# Patient Record
(demographics unavailable — no encounter records)

---

## 2024-12-05 NOTE — PHYSICAL EXAM
[FreeTextEntry1] : Patient is just waking up.  He needs help to get out of bed.  Smiles easily.  Right leg below the knee appears red and swollen  [General Appearance - Alert] : alert [General Appearance - In No Acute Distress] : in no acute distress [Oriented To Time, Place, And Person] : oriented to person, place, and time [Impaired Insight] : insight and judgment were intact [Affect] : the affect was normal [Person] : oriented to person [Place] : oriented to place [Time] : oriented to time [Concentration Intact] : normal concentrating ability [Visual Intact] : visual attention was ~T not ~L decreased [Naming Objects] : no difficulty naming common objects [Repeating Phrases] : no difficulty repeating a phrase [Writing A Sentence] : no difficulty writing a sentence [Fluency] : fluency intact [Comprehension] : comprehension intact [Reading] : reading intact [Past History] : adequate knowledge of personal past history [Cranial Nerves Optic (II)] : visual acuity intact bilaterally,  visual fields full to confrontation, pupils equal round and reactive to light [Cranial Nerves Oculomotor (III)] : extraocular motion intact [Cranial Nerves Trigeminal (V)] : facial sensation intact symmetrically [Cranial Nerves Facial (VII)] : face symmetrical [Cranial Nerves Vestibulocochlear (VIII)] : hearing was intact bilaterally [Cranial Nerves Glossopharyngeal (IX)] : tongue and palate midline [Cranial Nerves Accessory (XI - Cranial And Spinal)] : head turning and shoulder shrug symmetric [Cranial Nerves Hypoglossal (XII)] : there was no tongue deviation with protrusion [Motor Strength] : muscle strength was normal in all four extremities [No Muscle Atrophy] : normal bulk in all four extremities [Motor Handedness Right-Handed] : the patient is right hand dominant [Motor Strength Upper Extremities Bilaterally] : strength was normal in both upper extremities [Motor Strength Lower Extremities Bilaterally] : strength was normal in both lower extremities [Past-pointing] : there was no past-pointing [Tremor] : no tremor present [Plantar Reflex Right Only] : normal on the right [Plantar Reflex Left Only] : normal on the left [Extraocular Movements] : extraocular movements were intact [Hearing Threshold Finger Rub Not [place]iteau] : hearing was normal [Edema] : there was no peripheral edema [Abdomen Tenderness] : non-tender [Nail Clubbing] : no clubbing  or cyanosis of the fingernails [] : no rash

## 2024-12-05 NOTE — DISCUSSION/SUMMARY
[FreeTextEntry1] : This is an 86 -year-old right-handed male who presents for follow up of Parkinson's disease and Essential tremor, with symptoms since about 2017, with tremors at rest and when he is writing. In 2018, he was exposed to carbon monoxide and since then feels that his tremors have worsened to the point that he cannot write.  Impression: Parkinsonism, likely idiopathic Parkinson's disease, history of CO exposure, arthritis. MORRO scan abnormal, with the left side more affected.  Cognitive changes visual hallucinations Rivastigmine caused decreased appetite and weight loss Plan Add Namenda.  Titration kit and instructions sent to the pharmacy pill pack She is working with primary care physician for the leg swelling.  Had workup done there are no clots.  He is on oral antibiotics.  She plans on taking him to the ER/urgent care if there is no improvement in the leg symptoms cont Lexapro 10 mg daily As needed lorazepam.  Side effects including gait disturbance discussed in detail.  Daughter states that he is always under supervision of home health aide or herself and will pay attention Punding/repetitive behavior could be due to Rytary. Referral to psychiatry Continue quetiapine to 200 mg at bedtime -  difficulty maintaining sleep and hallucinations.  Side effects discussed in detail -Consider reducing Rytary from 3 capsules 3 times a day to 2 capsules in the morning and continue 3 the rest of the day.  Monitor for any worsening of parkinsonian features Continue physical therapy Has no difficulty swallowing will monitor, okay to open Rytary capsule and sprinkle on applesauce -All questions were addressed and answered Follow-up in 2-3 month, call sooner if needed We discussed the above impression, plan and recommendation during the visit. Counseling represented more than 50% of the 30-minute visit time

## 2024-12-05 NOTE — HISTORY OF PRESENT ILLNESS
[FreeTextEntry1] : This is an 82-year-old right-handed male who presents for follow up of Parkinson's Disease and Essential tremors. At this visit he is accompanied by his daughter, Maia.  Initial history: He reports tremors in his hands for about 2 years, very mild.  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 Since then the family has noticed that his tremors have increased and he is having more difficulty with this and daily activities. His tremors are at rest and they get worse when he is exposed to cold.  He is having difficulty with writing but has no difficulty eating or drinking. He is also having difficulty holding things. He reports having arthritis in his shoulders his back and that needs to some stiffness. Family he denies changes in memory or mood. He's not had any falls. No difficulty swallowing. His balance is not as good as before the CO exposure. He tried metoprolol but that did not help his tremors also he did not take it consistently. No family history of tremor. He does not drink any alcohol or coffee.  Past medical history Arthritis, hypertension, carbon monoxide exposure  family history unremarkable  Interval History: march 25, 2021-Patient is here to follow-up on Parkinson's disease. Since the last visit, the patient continues to take Propranolol 10mg 1 tab daily without side effects. He has increased to 2  tab Sinemet 25-100mg  QID.  States that things are going very well.  Rarely notices tremor..  not noticed any medication side effects. no falls, no dysphagia  Interval history January 14, 2022.  This is a telehealth visit.  Patient is accompanied by his daughter.  Patient's daughter states that overall he has been declining his balance was worse his memory and language are worse it is difficult for him to walk and to get out of bed.  He also reports that his bones are hurting.  He is having more difficulty holding his bladder.  He has urinary urgency during daytime but some incontinence at night.  He denies any fever or burning during urination.  On further clarifying medications with him he states that he has been taking only 1 carbidopa levodopa instead of 2 tablets 4 times a day.  Also he has stopped using propanolol 10 mg daily he denies any side effects on it but states that someone told him  that it is not a good medication to take.  His daughter does not notice any worsening of tremors  Interval history May 17, 2022.  Patient is accompanied by his daughter today.  This is a telehealth visit.  He is undergoing work-up for possible prostate cancer awaiting biopsy results.  Daughter finds that now that he has increased Sinemet to 2 tablets 4 times a day he is having less falls during daytime his facial expression is better but his stability is still quite poor especially at night.  He is having more difficulty holding his bladder he has difficulty buckling his pants and at night it is worse and sometimes he urinates on himself.  Also he is more confused and irritable than before.  Work-up so far did not reveal any infectious source.  Currently he takes Sinemet 2 tablets at 8, 12, 4, 8 he goes to bed at 12 midnight.  Interval history September 1, 2022 this is a telehealth visit patient is accompanied by his daughter.  States that he is noticing more freezing of gait has had a few falls.  Especially has a hard time in the morning when he can barely move.  Currently he is on Sinemet 25/100, 2 tablets at 8, 12, 4, 8.  He takes controlled release 50/200 at bedtime. He is getting physical therapy once a week and trying to use his cane more often.  She is also noticing mild changes in memory  Interval history October 12, 2022.  This is a telehealth visit.  Daughter states that he is having more difficulty with mobility.  Medications take a long time to kick in and lasts only about half an hour or so.  His gait is much different than before she is afraid that he will fall but he is very stubborn and refuses to use a cane or a walker.  He does get physical therapy at home and even they have suggested that he use a walker.  He is also noticing increased urinary frequency especially at bedtime.  He wakes up about 4 or 5 times.  Had a recent UA and culture which was negative.  He has difficulty making it to the bathroom on time.  Cognitively daughter is noticing some changes as well.  Sometimes speech is more dysarthric.  CAT scan of the head was ordered but patient is refusing to get it   Interval history November 16, 2022 this is a telehealth visit patient is accompanied by his daughter.  She states that his mobility has improved and he has been less irritable since Sinemet was increased to 25/250, 1 tablet every 4 hours he takes it at 5, 9, 1, 5, 9, 11.  Also rivastigmine 1.5 mg twice a day was added he still continues to wake up at night due to frequent urination but is able to have better mobility.  Once or twice he might call his daughter if he pees in his diaper.  They have followed up with urology and was told that there is no other treatment at present time he has history of prostate cancer.  inbrija was not covered by insurance.  Gets physical therapy once a week.  He is on aspirin and atorvastatin for stroke prevention  Interval history January 10, 2023.  Patient presents with his daughter to follow-up on Parkinson's disease.  He is having multiple episodes of waking up at night to urinate wakes up at least 6 times or so.  Goes just 3 times during the day.  Still having frequent falls sometimes he gets up very quickly does not use the cane or the walker always if he does use the cane he just carries it with him.  He was doing better when he was getting physical therapy however since he is not driving anymore he is getting home-based physical therapy which is very minimal.  He is not sleeping well at night he is tolerating quetiapine 25 mg without any side effects.  He is on rivastigmine 1.5 mg twice a day as per daughter that has helped his behavior.  No side effects no change in appetite no lightheadedness.  He is on selegiline 5 mg twice a day.  He takes Sinemet 25/250 up to 6 times a day.  Has stopped using controlled release Sinemet.  Interval history March 8, 2023.  This is a telehealth visit.  Patient is accompanied by his daughter.  She states that with Rytary 245 3 capsules 3 times a day his mobility is much better.  He is able to walk by himself use the bathroom.  They have not used inbrija at all.  She finds that in terms of hallucinations things are getting worse.  He constantly picking things from his fingers.  He feels that there is leaks and puts buckets to catch the water.  Even in terms of memory he appears worse than before.  They had a recent work-up by his PCP that he does not have a urine infection or any other infectious inflammatory conditions.  Kidney function was slightly elevated and they will be seeing urology soon.  No recent falls gets physical therapy  Interval history July 11, 2023.  Patient's daughter Iris states that in terms of mobility and walking he is doing much better with Rytary 245, 3 capsules 3 times a day.  She is noticing more changes with his cognition.  He does not communicate as much.  Has a 5 word vocabulary.  Does better in Creole and English.  He constantly repeats themselves gets agitated easily and is irritable.  He also has some hallucinations where he feels that there are leaks throughout the house he washes his hands constantly and brushes his teeth often.  He is on right rivastigmine 3 mg twice a day appetite is good does not have diarrhea in fact has some constipation.  He is sleeping well at night with his quetiapine 2 tablets 50 mg each at bedtime but still wakes up several times to use the bathroom.  He also has easy and sometimes inappropriate crying and mood changes  Interval history October 13, 2023.  This is a telehealth visit.  Daughter states that in terms of Parkinson's he is doing about the same his walking is actually okay he is irritable at times crying and gets angry easily sometimes sets off and tries to run away.  His speech has deteriorated quite a bit.  Difficult to understand.  His appetite is normal but he is losing weight they have an appointment with PCP.  Also had urology testing and hormone testing which were normal.  Slightly elevated alk phos normal 120 he was 140.  She also requested a form for her [facility] to be filled   interval history April 15, 2024; daughter states that he is unchanged he still continues to have some falls if he uses a cane he walks with the aid.  He is also very stubborn.  He has repetitive movements for example if he is washing dishes he will continue to wash decisions for some time.  Overall she feels that his communication has decreased quite a bit he does not pay as much attention he is able to process information but is just not as responsive he cries spontaneously.  Sometimes he freezes while eating.  Stopped using rivastigmine as it was causing reduction in appetite on stopping rivastigmine also felt that he was more clear in his thought process.  Interval history September 4, 2024.  This is a telehealth visit.  Daughter states that he is becoming more difficult for example he gets angry agitated and gets into a loop with certain behaviors - boy.  For example he likes to wash dishes and continues to do he likes to brush his teeth for long period of time.  When he is stopped from doing these behaviors he gets angry and 1 time actually broke the kitchen sink he listens to certain people and not to others.  He has 24-hour aide now. Current medications include Rytary 245 3 capsules 3 times a day Lexapro 5 mg a day Quetiapine 200 mg at bedtime inbrija as needed Has difficulty swallowing big pills like Rytary and calcium.  No difficulty drinking water or eating food  Interval history December 5, 2024.  This is a telehealth visit daughter states that he is having more cognitive issues still continues with the [place].  He has a 24-hour aide.  He has some redness of his right leg for which he is undergoing treatment that is limiting his ability to walk.  Currently on Rytary Lexapro quetiapine and Inbrija as needed.  Sleeps well at night.  Rarely uses lorazepam especially if he is agitated   [Home] : at home, [unfilled] , at the time of the visit. [Medical Office: ([facility])___] : at the medical office located in  [Verbal consent obtained from patient] : the patient, [unfilled] [FreeTextEntry4] : Daughter, Lizy

## 2024-12-05 NOTE — DATA REVIEWED
[de-identified] : mild periventricular white matter ischemia, global atrophy. [de-identified] : MORRO Scan abnormal L worse than R